# Patient Record
Sex: FEMALE | Race: WHITE
[De-identification: names, ages, dates, MRNs, and addresses within clinical notes are randomized per-mention and may not be internally consistent; named-entity substitution may affect disease eponyms.]

---

## 2015-01-02 VITALS — SYSTOLIC BLOOD PRESSURE: 132 MMHG | DIASTOLIC BLOOD PRESSURE: 80 MMHG

## 2017-10-24 ENCOUNTER — HOSPITAL ENCOUNTER (OUTPATIENT)
Dept: HOSPITAL 61 - LAB | Age: 42
Discharge: HOME | End: 2017-10-24
Payer: COMMERCIAL

## 2017-10-24 DIAGNOSIS — Z51.81: Primary | ICD-10-CM

## 2017-10-24 DIAGNOSIS — Z79.899: ICD-10-CM

## 2017-10-24 LAB
ALBUMIN SERPL-MCNC: 4.4 G/DL (ref 3.4–5)
ALBUMIN/GLOB SERPL: 1.1 {RATIO} (ref 1–1.7)
ALP SERPL-CCNC: 86 U/L (ref 46–116)
ALT SERPL-CCNC: 15 U/L (ref 14–59)
ANION GAP SERPL CALC-SCNC: 7 MMOL/L (ref 6–14)
AST SERPL-CCNC: 17 U/L (ref 15–37)
BASOPHILS # BLD AUTO: 0.1 X10^3/UL (ref 0–0.2)
BASOPHILS NFR BLD: 1 % (ref 0–3)
BILIRUB SERPL-MCNC: 0.3 MG/DL (ref 0.2–1)
BUN SERPL-MCNC: 11 MG/DL (ref 7–20)
BUN/CREAT SERPL: 18 (ref 6–20)
CALCIUM SERPL-MCNC: 9.9 MG/DL (ref 8.5–10.1)
CHLORIDE SERPL-SCNC: 99 MMOL/L (ref 98–107)
CHOLEST SERPL-MCNC: 204 MG/DL (ref 0–200)
CHOLEST/HDLC SERPL: 3.8 {RATIO}
CO2 SERPL-SCNC: 32 MMOL/L (ref 21–32)
CREAT SERPL-MCNC: 0.6 MG/DL (ref 0.6–1)
EOSINOPHIL NFR BLD: 3 % (ref 0–3)
ERYTHROCYTE [DISTWIDTH] IN BLOOD BY AUTOMATED COUNT: 14.8 % (ref 11.5–14.5)
GFR SERPLBLD BASED ON 1.73 SQ M-ARVRAT: 111.3 ML/MIN
GLOBULIN SER-MCNC: 3.9 G/DL (ref 2.2–3.8)
GLUCOSE SERPL-MCNC: 83 MG/DL (ref 70–99)
HCT VFR BLD CALC: 45.2 % (ref 36–47)
HDLC SERPL-MCNC: 53 MG/DL (ref 40–60)
HGB BLD-MCNC: 14.9 G/DL (ref 12–15.5)
LYMPHOCYTES # BLD: 2.8 X10^3/UL (ref 1–4.8)
LYMPHOCYTES NFR BLD AUTO: 30 % (ref 24–48)
MCH RBC QN AUTO: 32 PG (ref 25–35)
MCHC RBC AUTO-ENTMCNC: 33 G/DL (ref 31–37)
MCV RBC AUTO: 98 FL (ref 79–100)
MONOCYTES NFR BLD: 6 % (ref 0–9)
NEUTROPHILS NFR BLD AUTO: 60 % (ref 31–73)
NONHDLC SERPL-MCNC: 151 MG/DL (ref 0–129)
PLATELET # BLD AUTO: 284 X10^3/UL (ref 140–400)
POTASSIUM SERPL-SCNC: 4.4 MMOL/L (ref 3.5–5.1)
PROT SERPL-MCNC: 8.3 G/DL (ref 6.4–8.2)
RBC # BLD AUTO: 4.63 X10^6/UL (ref 3.5–5.4)
SODIUM SERPL-SCNC: 138 MMOL/L (ref 136–145)
TRIGL SERPL-MCNC: 70 MG/DL (ref 0–150)
WBC # BLD AUTO: 9.3 X10^3/UL (ref 4–11)

## 2017-10-24 PROCEDURE — 80053 COMPREHEN METABOLIC PANEL: CPT

## 2017-10-24 PROCEDURE — 36415 COLL VENOUS BLD VENIPUNCTURE: CPT

## 2017-10-24 PROCEDURE — 80061 LIPID PANEL: CPT

## 2017-10-24 PROCEDURE — 84443 ASSAY THYROID STIM HORMONE: CPT

## 2017-10-24 PROCEDURE — 83036 HEMOGLOBIN GLYCOSYLATED A1C: CPT

## 2017-10-24 PROCEDURE — 85025 COMPLETE CBC W/AUTO DIFF WBC: CPT

## 2018-11-28 ENCOUNTER — HOSPITAL ENCOUNTER (EMERGENCY)
Dept: HOSPITAL 61 - ER | Age: 43
Discharge: HOME | End: 2018-11-28
Payer: COMMERCIAL

## 2018-11-28 VITALS — SYSTOLIC BLOOD PRESSURE: 135 MMHG | DIASTOLIC BLOOD PRESSURE: 93 MMHG

## 2018-11-28 VITALS — WEIGHT: 102 LBS | HEIGHT: 64 IN | BODY MASS INDEX: 17.42 KG/M2

## 2018-11-28 DIAGNOSIS — N30.01: Primary | ICD-10-CM

## 2018-11-28 DIAGNOSIS — Z98.51: ICD-10-CM

## 2018-11-28 LAB
APTT PPP: YELLOW S
BACTERIA #/AREA URNS HPF: (no result) /HPF
BILIRUB UR QL STRIP: NEGATIVE
FIBRINOGEN PPP-MCNC: CLEAR MG/DL
NITRITE UR QL STRIP: POSITIVE
PH UR STRIP: 6 [PH]
PROT UR STRIP-MCNC: 100 MG/DL
RBC #/AREA URNS HPF: (no result) /HPF (ref 0–2)
SQUAMOUS #/AREA URNS LPF: (no result) /LPF
UROBILINOGEN UR-MCNC: 1 MG/DL
WBC #/AREA URNS HPF: (no result) /HPF (ref 0–4)

## 2018-11-28 PROCEDURE — 96372 THER/PROPH/DIAG INJ SC/IM: CPT

## 2018-11-28 PROCEDURE — 81001 URINALYSIS AUTO W/SCOPE: CPT

## 2018-11-28 PROCEDURE — 99283 EMERGENCY DEPT VISIT LOW MDM: CPT

## 2018-11-28 PROCEDURE — 87086 URINE CULTURE/COLONY COUNT: CPT

## 2018-11-28 NOTE — PHYS DOC
Past Medical History


Past Medical History:  No Pertinent History


Additional Past Medical Histor:  MIGRAINES


Past Surgical History:  Tubal ligation


Alcohol Use:  None


Drug Use:  None





Adult General


Chief Complaint


Chief Complaint:  URINARY FREQUENCY





HPI


HPI





Patient is a 43  year old female who presents with dysuria and left lower back 

pain. Patient reports she started having dysuria yesterday. She reports she 

began having lower back pain today. She denies any fever, nausea or vomiting.





Review of Systems


Review of Systems





Constitutional: Denies fever or chills []


Eyes: Denies change in visual acuity, redness, or eye pain []


Respiratory: Denies cough or shortness of breath []


Cardiovascular: No additional information not addressed in HPI []


GI: Denies abdominal pain, nausea, vomiting


: Reports dysuria, hematuria, frequency and urgency


Musculoskeletal: Reports left lower back pain


Integument: Denies rash or skin lesions []


Neurologic: Denies headache, focal weakness or sensory changes []








All other systems were reviewed and found to be within normal limits, except as 

documented in this note.





Current Medications


Current Medications





Current Medications








 Medications


  (Trade)  Dose


 Ordered  Sig/Prabhakar  Start Time


 Stop Time Status Last Admin


Dose Admin


 


 Ketorolac


 Tromethamine


  (Toradol Im)  60 mg  1X  ONCE  11/28/18 22:45


 11/28/18 22:46 DC 11/28/18 22:40


60 MG


 


 Phenazopyridine


 HCl


  (Pyridium)  200 mg  1X  ONCE  11/28/18 22:45


 11/28/18 22:46 DC 11/28/18 22:40


200 MG











Allergies


Allergies





Allergies








Coded Allergies Type Severity Reaction Last Updated Verified


 


  No Known Drug Allergies    1/2/15 No











Physical Exam


Physical Exam





Constitutional: Well developed, well nourished, no acute distress, non-toxic 

appearance. []


HENT: Normocephalic, atraumatic


Eyes: PERRLA, EOMI, conjunctiva normal, no discharge. [] 


Neck: Normal range of motion, no tenderness, supple, no stridor. [] 


Cardiovascular:Heart rate regular rhythm, no murmur []


Lungs & Thorax:  Bilateral breath sounds clear to auscultation []


Abdomen: Bowel sounds normal, soft, no tenderness


Skin: Warm, dry, no erythema, no rash. [] 


Neurologic: Alert and oriented X 3, normal motor function, normal sensory 

function, no focal deficits noted. []


Psychologic: Affect normal, judgement normal, mood normal. []





Current Patient Data


Vital Signs





 Vital Signs








  Date Time  Temp Pulse Resp B/P (MAP) Pulse Ox O2 Delivery O2 Flow Rate FiO2


 


11/28/18 22:15 98.3 102 18 135/93 (107) 98 Room Air  





 98.3       








Lab Values





 Laboratory Tests








Test


 11/28/18


22:08


 


Urine Collection Type Unknown  


 


Urine Color Yellow  


 


Urine Clarity Clear  


 


Urine pH 6.0  


 


Urine Specific Gravity 1.020  


 


Urine Protein


 100 mg/dL


(NEG-TRACE)


 


Urine Glucose (UA)


 Negative mg/dL


(NEG)


 


Urine Ketones (Stick)


 15 mg/dL (NEG)





 


Urine Blood Large (NEG)  


 


Urine Nitrite


 Positive (NEG)





 


Urine Bilirubin


 Negative (NEG)





 


Urine Urobilinogen Dipstick


 1.0 mg/dL (0.2


mg/dL)


 


Urine Leukocyte Esterase


 Moderate (NEG)





 


Urine RBC


 Tntc /HPF


(0-2)


 


Urine WBC


 Tntc /HPF


(0-4)


 


Urine Squamous Epithelial


Cells Occ /LPF  





 


Urine Bacteria


 Moderate /HPF


(0-FEW)


 


Urine Mucus Slight /LPF  











EKG


EKG


[]





Radiology/Procedures


Radiology/Procedures


[]





Course & Med Decision Making


Course & Med Decision Making


Pertinent Labs and Imaging studies reviewed. (See chart for details)





Plan: Bactrim Rx, Pyridium Rx, follow-up with PCP, return precautions reviewed[]





Dragon Disclaimer


Dragon Disclaimer


This electronic medical record was generated, in whole or in part, using a 

voice recognition dictation system.





Departure


Departure


Impression:  


 Primary Impression:  


 UTI (urinary tract infection)


Disposition:  01 HOME, SELF-CARE


Condition:  GOOD


Referrals:  


NO PCP (PCP)








CHASE IRVIN MD


Patient Instructions:  Urinary Tract Infection


Scripts


Phenazopyridine Hcl (PYRIDIUM) 100 Mg Tablet


100 MG PO TID, #9 TAB


   Prov: GOLDIE FERNANDES APRN         11/28/18 


Sulfamethoxazole/Trimethoprim (BACTRIM DS TABLET) 1 Each Tablet


1 TAB PO BID, #14 TAB


   Prov: GOLDIE FERNANDES APRN 11/28/18





Attending Signature


Attending Signature


I have reviewed the PA/NP's note and plan of care. I was available for 

consultation as needed during the patient's visit in the emergency department. 

I agree with the clinical impression, plan, and disposition.





Problem Qualifiers








 Primary Impression:  


 UTI (urinary tract infection)


 Urinary tract infection type:  acute cystitis  Hematuria presence:  with 

hematuria  Qualified Codes:  N30.01 - Acute cystitis with hematuria








GOLDIE FERNANDES Nov 28, 2018 22:59


SHEILA WADE DO Nov 29, 2018 01:15

## 2019-01-31 ENCOUNTER — HOSPITAL ENCOUNTER (EMERGENCY)
Dept: HOSPITAL 61 - ER | Age: 44
Discharge: HOME | End: 2019-01-31
Payer: COMMERCIAL

## 2019-01-31 VITALS — WEIGHT: 102 LBS | HEIGHT: 64 IN | BODY MASS INDEX: 17.42 KG/M2

## 2019-01-31 VITALS — SYSTOLIC BLOOD PRESSURE: 140 MMHG | DIASTOLIC BLOOD PRESSURE: 84 MMHG

## 2019-01-31 DIAGNOSIS — Z98.51: ICD-10-CM

## 2019-01-31 DIAGNOSIS — K04.7: Primary | ICD-10-CM

## 2019-01-31 DIAGNOSIS — G43.909: ICD-10-CM

## 2019-01-31 PROCEDURE — 96372 THER/PROPH/DIAG INJ SC/IM: CPT

## 2019-01-31 PROCEDURE — 99283 EMERGENCY DEPT VISIT LOW MDM: CPT

## 2019-01-31 NOTE — PHYS DOC
Past Medical History


Past Medical History:  No Pertinent History


Additional Past Medical Histor:  MIGRAINES


Past Surgical History:  Tubal ligation


Alcohol Use:  None


Drug Use:  None





Adult General


Chief Complaint


Chief Complaint:  DENTAL PROBLEM





HPI


HPI





Patient is a 43  year old [f__sex] who presents with []





Review of Systems


Review of Systems





Constitutional: Denies fever or chills []


Eyes: Denies change in visual acuity, redness, or eye pain []


HENT: Denies nasal congestion or sore throat []


Respiratory: Denies cough or shortness of breath []


Cardiovascular: No additional information not addressed in HPI []


GI: Denies abdominal pain, nausea, vomiting, bloody stools or diarrhea []


: Denies dysuria or hematuria []


Musculoskeletal: Denies back pain or joint pain []


Integument: Denies rash or skin lesions []


Neurologic: Denies headache, focal weakness or sensory changes []


Endocrine: Denies polyuria or polydipsia []





All other systems were reviewed and found to be within normal limits, except as 

documented in this note.





Current Medications


Current Medications





Current Medications








 Medications


  (Trade)  Dose


 Ordered  Sig/Prabhakar  Start Time


 Stop Time Status Last Admin


Dose Admin


 


 Ketorolac


 Tromethamine


  (Toradol Im)  60 mg  1X  ONCE  1/31/19 23:45


 1/31/19 23:46 DC 1/31/19 23:49


60 MG











Allergies


Allergies





Allergies








Coded Allergies Type Severity Reaction Last Updated Verified


 


  No Known Drug Allergies    1/2/15 No











Physical Exam


Physical Exam





Constitutional: Well developed, well nourished, no acute distress, non-toxic 

appearance. []


HENT: Normocephalic, atraumatic, bilateral external ears normal, oropharynx 

moist, no oral exudates, nose normal. []


Eyes: PERRLA, EOMI, conjunctiva normal, no discharge. [] 


Neck: Normal range of motion, no tenderness, supple, no stridor. [] 


Cardiovascular:Heart rate regular rhythm, no murmur []


Lungs & Thorax:  Bilateral breath sounds clear to auscultation []


Abdomen: Bowel sounds normal, soft, no tenderness, no masses, no pulsatile 

masses. [] 


Skin: Warm, dry, no erythema, no rash. [] 


Back: No tenderness, no CVA tenderness. [] 


Extremities: No tenderness, no cyanosis, no clubbing, ROM intact, no edema. [] 


Neurologic: Alert and oriented X 3, normal motor function, normal sensory 

function, no focal deficits noted. []


Psychologic: Affect normal, judgement normal, mood normal. []





EKG


EKG


[]





Radiology/Procedures


Radiology/Procedures


[]





Course & Med Decision Making


Course & Med Decision Making


Pertinent Labs and Imaging studies reviewed. (See chart for details)





[]





Dragon Disclaimer


Dragon Disclaimer


This electronic medical record was generated, in whole or in part, using a 

voice recognition dictation system.





Departure


Departure


Impression:  


 Primary Impression:  


 Dental infection


Disposition:  01 HOME, SELF-CARE


Condition:  STABLE


Referrals:  


NO PCP (PCP)


Patient Instructions:  Dental Caries





Additional Instructions:  


Take the medication as prescribed. Do not drive or operate heavy machinery 

while taking the pain medication. Keep your dental appointment for this coming 

Saturday. If worsening return to the emergency department.


Scripts


Hydrocodone/Apap 5-325 (NORCO 5-325 TABLET) 1 Each Tablet


1 TAB PO PRN Q6HRS PRN for PAIN, #14 TAB 0 Refills


   Prov: FIDE BASSETT         1/31/19 


Amoxicillin (AMOXICILLIN) 500 Mg Capsule


2 CAP PO BID for dental infection, #40 CAP


   Prov: FIDE BASSETT APRN         1/31/19











FIDE BASSETT Jan 31, 2019 23:53

## 2019-03-05 ENCOUNTER — HOSPITAL ENCOUNTER (EMERGENCY)
Dept: HOSPITAL 61 - ER | Age: 44
Discharge: HOME | End: 2019-03-05
Payer: COMMERCIAL

## 2019-03-05 VITALS — WEIGHT: 102 LBS | HEIGHT: 64 IN | BODY MASS INDEX: 17.42 KG/M2

## 2019-03-05 VITALS — SYSTOLIC BLOOD PRESSURE: 107 MMHG | DIASTOLIC BLOOD PRESSURE: 73 MMHG

## 2019-03-05 DIAGNOSIS — Z98.890: ICD-10-CM

## 2019-03-05 DIAGNOSIS — Z98.51: ICD-10-CM

## 2019-03-05 DIAGNOSIS — G43.909: ICD-10-CM

## 2019-03-05 DIAGNOSIS — R42: ICD-10-CM

## 2019-03-05 DIAGNOSIS — R55: Primary | ICD-10-CM

## 2019-03-05 DIAGNOSIS — F41.9: ICD-10-CM

## 2019-03-05 DIAGNOSIS — E87.6: ICD-10-CM

## 2019-03-05 DIAGNOSIS — F32.9: ICD-10-CM

## 2019-03-05 LAB
ALBUMIN SERPL-MCNC: 3.3 G/DL (ref 3.4–5)
ALBUMIN/GLOB SERPL: 1.1 {RATIO} (ref 1–1.7)
ALP SERPL-CCNC: 57 U/L (ref 46–116)
ALT SERPL-CCNC: 12 U/L (ref 14–59)
ANION GAP SERPL CALC-SCNC: 9 MMOL/L (ref 6–14)
APTT PPP: YELLOW S
AST SERPL-CCNC: 20 U/L (ref 15–37)
BACTERIA #/AREA URNS HPF: 0 /HPF
BASOPHILS # BLD AUTO: 0 X10^3/UL (ref 0–0.2)
BASOPHILS NFR BLD: 0 % (ref 0–3)
BILIRUB SERPL-MCNC: 0.4 MG/DL (ref 0.2–1)
BILIRUB UR QL STRIP: NEGATIVE
BUN SERPL-MCNC: 11 MG/DL (ref 7–20)
BUN/CREAT SERPL: 22 (ref 6–20)
CALCIUM SERPL-MCNC: 7.9 MG/DL (ref 8.5–10.1)
CHLORIDE SERPL-SCNC: 107 MMOL/L (ref 98–107)
CK SERPL-CCNC: 97 U/L (ref 26–192)
CO2 SERPL-SCNC: 27 MMOL/L (ref 21–32)
CREAT SERPL-MCNC: 0.5 MG/DL (ref 0.6–1)
EOSINOPHIL NFR BLD: 0.2 X10^3/UL (ref 0–0.7)
EOSINOPHIL NFR BLD: 3 % (ref 0–3)
ERYTHROCYTE [DISTWIDTH] IN BLOOD BY AUTOMATED COUNT: 14.5 % (ref 11.5–14.5)
FIBRINOGEN PPP-MCNC: CLEAR MG/DL
GFR SERPLBLD BASED ON 1.73 SQ M-ARVRAT: 136 ML/MIN
GLOBULIN SER-MCNC: 3.1 G/DL (ref 2.2–3.8)
GLUCOSE SERPL-MCNC: 77 MG/DL (ref 70–99)
HCT VFR BLD CALC: 40.8 % (ref 36–47)
HGB BLD-MCNC: 13.4 G/DL (ref 12–15.5)
LYMPHOCYTES # BLD: 3.4 X10^3/UL (ref 1–4.8)
LYMPHOCYTES NFR BLD AUTO: 39 % (ref 24–48)
MAGNESIUM SERPL-MCNC: 2 MG/DL (ref 1.8–2.4)
MCH RBC QN AUTO: 32 PG (ref 25–35)
MCHC RBC AUTO-ENTMCNC: 33 G/DL (ref 31–37)
MCV RBC AUTO: 99 FL (ref 79–100)
MONO #: 0.4 X10^3/UL (ref 0–1.1)
MONOCYTES NFR BLD: 4 % (ref 0–9)
NEUT #: 4.6 X10^3UL (ref 1.8–7.7)
NEUTROPHILS NFR BLD AUTO: 53 % (ref 31–73)
NITRITE UR QL STRIP: NEGATIVE
PH UR STRIP: 7 [PH]
PLATELET # BLD AUTO: 280 X10^3/UL (ref 140–400)
POTASSIUM SERPL-SCNC: 3.4 MMOL/L (ref 3.5–5.1)
PROT SERPL-MCNC: 6.4 G/DL (ref 6.4–8.2)
PROT UR STRIP-MCNC: NEGATIVE MG/DL
RBC # BLD AUTO: 4.13 X10^6/UL (ref 3.5–5.4)
RBC #/AREA URNS HPF: (no result) /HPF (ref 0–2)
SODIUM SERPL-SCNC: 143 MMOL/L (ref 136–145)
SQUAMOUS #/AREA URNS LPF: (no result) /LPF
UROBILINOGEN UR-MCNC: 0.2 MG/DL
WBC # BLD AUTO: 8.6 X10^3/UL (ref 4–11)
WBC #/AREA URNS HPF: 0 /HPF (ref 0–4)

## 2019-03-05 PROCEDURE — 96360 HYDRATION IV INFUSION INIT: CPT

## 2019-03-05 PROCEDURE — 84484 ASSAY OF TROPONIN QUANT: CPT

## 2019-03-05 PROCEDURE — 81001 URINALYSIS AUTO W/SCOPE: CPT

## 2019-03-05 PROCEDURE — 85025 COMPLETE CBC W/AUTO DIFF WBC: CPT

## 2019-03-05 PROCEDURE — 81025 URINE PREGNANCY TEST: CPT

## 2019-03-05 PROCEDURE — 83735 ASSAY OF MAGNESIUM: CPT

## 2019-03-05 PROCEDURE — 99284 EMERGENCY DEPT VISIT MOD MDM: CPT

## 2019-03-05 PROCEDURE — 36415 COLL VENOUS BLD VENIPUNCTURE: CPT

## 2019-03-05 PROCEDURE — 80053 COMPREHEN METABOLIC PANEL: CPT

## 2019-03-05 PROCEDURE — 93005 ELECTROCARDIOGRAM TRACING: CPT

## 2019-03-05 PROCEDURE — 82553 CREATINE MB FRACTION: CPT

## 2019-03-05 PROCEDURE — 70450 CT HEAD/BRAIN W/O DYE: CPT

## 2019-03-05 NOTE — PHYS DOC
Past Medical History


Past Medical History:  Anxiety, Depression, Other


Additional Past Medical Histor:  MIGRAINES, PTSD, INSOMNIA


Past Surgical History:  , Tubal ligation


Alcohol Use:  None


Drug Use:  None





Adult General


Chief Complaint


Chief Complaint:  NEAR SYNCOPE





HPI


HPI





Patient is a 43  year old who presented to the ED after a syncopal episode 

earlier today. While walking around at work and lifting boxes, she started to 

feel hot in the face, flushed, and lightheaded.


Then she remembers waking up on the floor with her coworkers around her. She 

and her coworkers are unaware of how long she was down. Patient also reports 

having acute vision changes lately. She's had unilateral visual blackouts of 

either eye as a prodrome to her chronic migraines. However lately both eyes go 

completely black instantly without warning. She is going to see the eye doctor 

within the next few days. Patient reports he drinks a large amount of caffeine 

and minimal water. She has a frequent episodes of facial flushing and feeling 

lightheaded like this but usually does not faint.  Denies pregnancy.





Review of Systems


Review of Systems





Constitutional: Denies fever or chills []


Eyes: Admits to visual field changes. Denies redness or pain


HENT: Denies nasal congestion or sore throat []


Respiratory: Denies cough or shortness of breath []


Cardiovascular: Denies chest pain or palpitations.


GI: Denies abdominal pain, nausea, vomiting, or diarrhea []


: Denies dysuria or hematuria []


Musculoskeletal: Denies back pain or joint pain []


Integument: Denies rash or skin lesions []


Neurologic: Denies headache, focal weakness or sensory changes []








Complete systems were reviewed and found to be within normal limits, except as 

documented in this note.





Current Medications


Current Medications





Current Medications








 Medications


  (Trade)  Dose


 Ordered  Sig/Prabhakar  Start Time


 Stop Time Status Last Admin


Dose Admin


 


 Potassium Chloride


  (Klor-Con)  40 meq  1X  ONCE  3/5/19 23:00


 3/5/19 23:01 DC 3/5/19 23:07


40 MEQ


 


 Sodium Chloride  1,000 ml @ 


 1,000 mls/hr  1X  ONCE  3/5/19 20:15


 3/5/19 21:14 DC 3/5/19 21:30


1,000 MLS/HR











Allergies


Allergies





Allergies








Coded Allergies Type Severity Reaction Last Updated Verified


 


  No Known Drug Allergies    1/2/15 No











Physical Exam


Physical Exam





Constitutional: Well developed, well nourished, no acute distress, non-toxic 

appearance. []


HENT: Normocephalic, atraumatic, oropharynx moist


Eyes: PERRL, EOMI, conjunctiva normal, no discharge. [] 


Neck: Normal range of motion, no tenderness, supple, no stridor. [] 


Cardiovascular: Heart rate regular rhythm, no murmur []


Lungs & Thorax:  Bilateral breath sounds clear to auscultation []


Abdomen: soft, no tenderness


Skin: Warm, dry, no erythema, no rash. [] 


Back: No tenderness, no CVA tenderness. [] 


Extremities: No tenderness, ROM intact, no edema. [] 


Neurologic: Alert and oriented X 3, normal motor function, normal sensory 

function, no focal deficits noted. []


Psychologic: Affect normal, judgement normal, mood normal. []





Current Patient Data


Vital Signs





 Vital Signs








  Date Time  Temp Pulse Resp B/P (MAP) Pulse Ox O2 Delivery O2 Flow Rate FiO2


 


3/5/19 23:00  78 18  100   


 


3/5/19 19:47 98.0   117/79 (92)  Room Air  





 98.0       








Lab Values





 Laboratory Tests








Test


 3/5/19


21:00 3/5/19


21:21


 


White Blood Count


 8.6 x10^3/uL


(4.0-11.0) 





 


Red Blood Count


 4.13 x10^6/uL


(3.50-5.40) 





 


Hemoglobin


 13.4 g/dL


(12.0-15.5) 





 


Hematocrit


 40.8 %


(36.0-47.0) 





 


Mean Corpuscular Volume


 99 fL ()


 





 


Mean Corpuscular Hemoglobin 32 pg (25-35)   


 


Mean Corpuscular Hemoglobin


Concent 33 g/dL


(31-37) 





 


Red Cell Distribution Width


 14.5 %


(11.5-14.5) 





 


Platelet Count


 280 x10^3/uL


(140-400) 





 


Neutrophils (%) (Auto) 53 % (31-73)   


 


Lymphocytes (%) (Auto) 39 % (24-48)   


 


Monocytes (%) (Auto) 4 % (0-9)   


 


Eosinophils (%) (Auto) 3 % (0-3)   


 


Basophils (%) (Auto) 0 % (0-3)   


 


Neutrophils # (Auto)


 4.6 x10^3uL


(1.8-7.7) 





 


Lymphocytes # (Auto)


 3.4 x10^3/uL


(1.0-4.8) 





 


Monocytes # (Auto)


 0.4 x10^3/uL


(0.0-1.1) 





 


Eosinophils # (Auto)


 0.2 x10^3/uL


(0.0-0.7) 





 


Basophils # (Auto)


 0.0 x10^3/uL


(0.0-0.2) 





 


Urine Collection Type Unknown   


 


Urine Color Yellow   


 


Urine Clarity Clear   


 


Urine pH 7.0   


 


Urine Specific Gravity 1.010   


 


Urine Protein


 Negative mg/dL


(NEG-TRACE) 





 


Urine Glucose (UA)


 Negative mg/dL


(NEG) 





 


Urine Ketones (Stick)


 Negative mg/dL


(NEG) 





 


Urine Blood


 Negative (NEG)


 





 


Urine Nitrite


 Negative (NEG)


 





 


Urine Bilirubin


 Negative (NEG)


 





 


Urine Urobilinogen Dipstick


 0.2 mg/dL (0.2


mg/dL) 





 


Urine Leukocyte Esterase


 Negative (NEG)


 





 


Urine RBC


 1-2 /HPF (0-2)


 





 


Urine WBC 0 /HPF (0-4)   


 


Urine Squamous Epithelial


Cells Few /LPF  


 





 


Urine Bacteria


 0 /HPF (0-FEW)


 





 


Urine Mucus Mod /LPF   


 


Sodium Level


 143 mmol/L


(136-145) 





 


Potassium Level


 3.4 mmol/L


(3.5-5.1)  L 





 


Chloride Level


 107 mmol/L


() 





 


Carbon Dioxide Level


 27 mmol/L


(21-32) 





 


Anion Gap 9 (6-14)   


 


Blood Urea Nitrogen


 11 mg/dL


(7-20) 





 


Creatinine


 0.5 mg/dL


(0.6-1.0)  L 





 


Estimated GFR


(Cockcroft-Gault) 136.0  


 





 


BUN/Creatinine Ratio 22 (6-20)  H 


 


Glucose Level


 77 mg/dL


(70-99) 





 


Calcium Level


 7.9 mg/dL


(8.5-10.1)  L 





 


Magnesium Level


 2.0 mg/dL


(1.8-2.4) 





 


Total Bilirubin


 0.4 mg/dL


(0.2-1.0) 





 


Aspartate Amino Transferase


(AST) 20 U/L (15-37)


 





 


Alanine Aminotransferase (ALT)


 12 U/L (14-59)


L 





 


Alkaline Phosphatase


 57 U/L


() 





 


Creatine Kinase


 97 U/L


() 





 


Creatine Kinase MB (Mass)


 0.8 ng/mL


(0.0-3.6) 





 


Creatine Kinase MB Relative


Index 0.8 % (0-4)  


 





 


Troponin I Quantitative


 < 0.017 ng/mL


(0.000-0.055) 





 


Total Protein


 6.4 g/dL


(6.4-8.2) 





 


Albumin


 3.3 g/dL


(3.4-5.0)  L 





 


Albumin/Globulin Ratio 1.1 (1.0-1.7)   


 


POC Urine HCG, Qualitative


 


 Hcg negative


(Negative)





 Laboratory Tests


3/5/19 21:00








 Laboratory Tests


3/5/19 21:00














EKG


EKG


@2058 NSR at 76bpm NO ST elevation,





Radiology/Procedures


Radiology/Procedures


PROCEDURE: CT HEAD WO CONTRAST





CT HEAD WO CONTRAST


 


History: headache, syncope


 


Comparison: None.


 


Technique: Noncontrast CT imaging was performed of the head.  Exposure: 


One or more of the following individualized dose reduction techniques were


utilized for this examination:  1. Automated exposure control  2. 


Adjustment of the mA and/or kV according to patient size  3. Use of 


iterative reconstruction technique.


 


Findings: No acute extra-axial or parenchymal hemorrhage is identified. 


There is no significant intra-axial mass effect, midline shift, or 


extra-axial fluid collection. The gray-white differentiation of the major 


vascular territories is preserved.  The ventricles, sulci, and cisterns 


are within normal limits in size and configuration.   The mastoid air 


cells and the visualized paranasal sinuses are aerated.  No acute 


calvarial abnormality is identified.


 


Impression:


1. No acute intracranial abnormality is identified.  


 


Electronically signed by: John Monsivais MD (3/5/2019 9:54 PM) [facility]





Course & Med Decision Making


Course & Med Decision Making


Pertinent Labs and Imaging studies reviewed. (See chart for details)





Patient is a 43-year-old female presented to the ED after syncopal episode at 

work. CT head showed no acute intracranial abnormalities. Neurological exam 

completely benign. Lab work unremarkable except for minor hypokalemia which was 

corrected. Given presentation at work with physical exertion, high caffeine 

intake with minimal water intake, and chronic nature of the etiology, this is 

thought to be vasovagal syncope. 





Patient stable for discharge with outpatient follow-up with PCP. Discussed 

findings and plan with patient and family, who acknowledge understanding and 

agreement.[]





Dragon Disclaimer


Dragon Disclaimer


This electronic medical record was generated, in whole or in part, using a 

voice recognition dictation system.





Departure


Departure


Impression:  


 Primary Impression:  


 Syncope


 Additional Impression:  


 Hypokalemia


Disposition:  01 HOME, SELF-CARE


Condition:  STABLE


Referrals:  


NO PCP (PCP)


Patient Instructions:  Hypokalemia-Brief, Potassium Content of Foods, Syncope, 

Easy-to-Read





Problem Qualifiers








 Primary Impression:  


 Syncope


 Syncope type:  unspecified  Qualified Codes:  R55 - Syncope and collapse








SHEILA WADE DO Mar 5, 2019 22:26

## 2019-03-05 NOTE — RAD
CT HEAD WO CONTRAST

 

History: headache, syncope

 

Comparison: None.

 

Technique: Noncontrast CT imaging was performed of the head.  Exposure: 

One or more of the following individualized dose reduction techniques were

utilized for this examination:  1. Automated exposure control  2. 

Adjustment of the mA and/or kV according to patient size  3. Use of 

iterative reconstruction technique.

 

Findings: No acute extra-axial or parenchymal hemorrhage is identified. 

There is no significant intra-axial mass effect, midline shift, or 

extra-axial fluid collection. The gray-white differentiation of the major 

vascular territories is preserved.  The ventricles, sulci, and cisterns 

are within normal limits in size and configuration.   The mastoid air 

cells and the visualized paranasal sinuses are aerated.  No acute 

calvarial abnormality is identified.

 

Impression:

1. No acute intracranial abnormality is identified.  

 

Electronically signed by: John Monsivais MD (3/5/2019 9:54 PM) [facility]

## 2019-08-05 ENCOUNTER — HOSPITAL ENCOUNTER (EMERGENCY)
Dept: HOSPITAL 61 - ER | Age: 44
Discharge: HOME | End: 2019-08-05
Payer: COMMERCIAL

## 2019-08-05 VITALS — HEIGHT: 64 IN | BODY MASS INDEX: 17.75 KG/M2 | WEIGHT: 104 LBS

## 2019-08-05 VITALS — SYSTOLIC BLOOD PRESSURE: 115 MMHG | DIASTOLIC BLOOD PRESSURE: 82 MMHG

## 2019-08-05 DIAGNOSIS — F32.9: ICD-10-CM

## 2019-08-05 DIAGNOSIS — G43.909: ICD-10-CM

## 2019-08-05 DIAGNOSIS — R55: Primary | ICD-10-CM

## 2019-08-05 DIAGNOSIS — F41.9: ICD-10-CM

## 2019-08-05 LAB
ALBUMIN SERPL-MCNC: 3.5 G/DL (ref 3.4–5)
ALBUMIN/GLOB SERPL: 1.1 {RATIO} (ref 1–1.7)
ALP SERPL-CCNC: 61 U/L (ref 46–116)
ALT SERPL-CCNC: 16 U/L (ref 14–59)
ANION GAP SERPL CALC-SCNC: 6 MMOL/L (ref 6–14)
APTT PPP: YELLOW S
AST SERPL-CCNC: 15 U/L (ref 15–37)
BACTERIA #/AREA URNS HPF: (no result) /HPF
BASOPHILS # BLD AUTO: 0.1 X10^3/UL (ref 0–0.2)
BASOPHILS NFR BLD: 1 % (ref 0–3)
BILIRUB SERPL-MCNC: 0.3 MG/DL (ref 0.2–1)
BILIRUB UR QL STRIP: NEGATIVE
BUN SERPL-MCNC: 11 MG/DL (ref 7–20)
BUN/CREAT SERPL: 16 (ref 6–20)
CALCIUM SERPL-MCNC: 8.7 MG/DL (ref 8.5–10.1)
CHLORIDE SERPL-SCNC: 105 MMOL/L (ref 98–107)
CO2 SERPL-SCNC: 30 MMOL/L (ref 21–32)
CREAT SERPL-MCNC: 0.7 MG/DL (ref 0.6–1)
EOSINOPHIL NFR BLD: 0.3 X10^3/UL (ref 0–0.7)
EOSINOPHIL NFR BLD: 3 % (ref 0–3)
ERYTHROCYTE [DISTWIDTH] IN BLOOD BY AUTOMATED COUNT: 14.3 % (ref 11.5–14.5)
FIBRINOGEN PPP-MCNC: CLEAR MG/DL
GFR SERPLBLD BASED ON 1.73 SQ M-ARVRAT: 92.2 ML/MIN
GLOBULIN SER-MCNC: 3.2 G/DL (ref 2.2–3.8)
GLUCOSE SERPL-MCNC: 69 MG/DL (ref 70–99)
HCT VFR BLD CALC: 36.2 % (ref 36–47)
HGB BLD-MCNC: 12.4 G/DL (ref 12–15.5)
LYMPHOCYTES # BLD: 3.4 X10^3/UL (ref 1–4.8)
LYMPHOCYTES NFR BLD AUTO: 40 % (ref 24–48)
MAGNESIUM SERPL-MCNC: 2 MG/DL (ref 1.8–2.4)
MCH RBC QN AUTO: 34 PG (ref 25–35)
MCHC RBC AUTO-ENTMCNC: 34 G/DL (ref 31–37)
MCV RBC AUTO: 99 FL (ref 79–100)
MONO #: 0.5 X10^3/UL (ref 0–1.1)
MONOCYTES NFR BLD: 6 % (ref 0–9)
NEUT #: 4.4 X10^3/UL (ref 1.8–7.7)
NEUTROPHILS NFR BLD AUTO: 51 % (ref 31–73)
NITRITE UR QL STRIP: NEGATIVE
PH UR STRIP: 6.5 [PH]
PLATELET # BLD AUTO: 243 X10^3/UL (ref 140–400)
POTASSIUM SERPL-SCNC: 3.7 MMOL/L (ref 3.5–5.1)
PROT SERPL-MCNC: 6.7 G/DL (ref 6.4–8.2)
PROT UR STRIP-MCNC: NEGATIVE MG/DL
RBC # BLD AUTO: 3.67 X10^6/UL (ref 3.5–5.4)
RBC #/AREA URNS HPF: (no result) /HPF (ref 0–2)
SODIUM SERPL-SCNC: 141 MMOL/L (ref 136–145)
SQUAMOUS #/AREA URNS LPF: (no result) /LPF
UROBILINOGEN UR-MCNC: 0.2 MG/DL
WBC # BLD AUTO: 8.6 X10^3/UL (ref 4–11)
WBC #/AREA URNS HPF: 0 /HPF (ref 0–4)

## 2019-08-05 PROCEDURE — 83735 ASSAY OF MAGNESIUM: CPT

## 2019-08-05 PROCEDURE — 93005 ELECTROCARDIOGRAM TRACING: CPT

## 2019-08-05 PROCEDURE — 81001 URINALYSIS AUTO W/SCOPE: CPT

## 2019-08-05 PROCEDURE — 80053 COMPREHEN METABOLIC PANEL: CPT

## 2019-08-05 PROCEDURE — 81025 URINE PREGNANCY TEST: CPT

## 2019-08-05 PROCEDURE — 85025 COMPLETE CBC W/AUTO DIFF WBC: CPT

## 2019-08-05 PROCEDURE — 36415 COLL VENOUS BLD VENIPUNCTURE: CPT

## 2019-08-05 NOTE — PHYS DOC
Past Medical History


Past Medical History:  Anxiety, Depression, Other


Additional Past Medical Histor:  MIGRAINES, PTSD, INSOMNIA


Past Surgical History:  , Tubal ligation


Alcohol Use:  None


Drug Use:  None





Adult General


Chief Complaint


Chief Complaint:  SYNCOPE





HPI


HPI





44 y/o female presents to ER via POV for c/o concerns for heat exposure as her 

employer found her asleep in her car after approx. 20 min. work break. She 

reports she has had multiple episodes of near syncope episodes and moments where

she feels like she has passed out. Patient states she was at [facility] 2 weeks ago with similar issues and she is scheduled with a 

cardiologist at the end of this month for further evaluation. Patient denies any

chest pain, shortness of air, palpitations, dizziness, or nausea and vomiting. 

She states she is uncertain if he has something to do with it as she works in a 

warehouse and she was in her car with warm temperatures today. Patient denies 

excessive diaphoresis. Patient denies urinary symptoms. She denies any 

confusion.





Review of Systems


Review of Systems





Constitutional: Denies fever or chills []


Eyes: Denies change in visual acuity, redness, or eye pain []


HENT: Denies nasal congestion or sore throat []


Respiratory: Denies cough or shortness of breath []


Cardiovascular: Denies CP/palpitations


GI: Denies abdominal pain, nausea, vomiting, bloody stools or diarrhea []


: Denies dysuria or hematuria []


Musculoskeletal: Denies back pain or joint pain []


Integument: Denies rash or skin lesions []


Neurologic: Denies headache, focal weakness or sensory changes. Reports near 

syncope episode and feeling fatigued. States she has had multiple episodes like 

this in the past where she feels she is going to pass out. States she was asleep

 in her car when employer found her stating she woke easily and was able to 

drive herself to the ER. Denies dizziness


Endocrine: Denies polyuria or polydipsia []





All other systems were reviewed and found to be within normal limits, except as 

documented in this note.





Allergies


Allergies





Allergies








Coded Allergies Type Severity Reaction Last Updated Verified


 


  No Known Drug Allergies    1/2/15 No











Physical Exam


Physical Exam





Constitutional: Well developed, well nourished, no acute distress, non-toxic 

appearance. Clear speech


HENT: Normocephalic, atraumatic, oropharynx moist, no oral exudates, nose 

normal. []


Eyes: PERRLA, no nystagmus, conjunctiva normal, no discharge. [] 


Neck: Normal range of motion, no tenderness, supple, no stridor. [] 


Cardiovascular:Heart rate regular rhythm, no murmur []


Lungs & Thorax:  Bilateral breath sounds clear to auscultation- resp. 

equal/nonlabored


Abdomen: Bowel sounds normal, soft, no tenderness, no masses, no pulsatile 

masses. [] 


Skin: Warm, dry, no erythema, no rash. [] 


Back: No tenderness, no CVA tenderness. [] 


Extremities: No tenderness, no cyanosis, no clubbing, ROM intact, no edema. [] 


Neurologic: Alert and oriented X 3, normal motor function, normal sensory 

function, no focal deficits noted. []


Psychologic: Affect normal, judgement normal, mood normal. []





Current Patient Data


Vital Signs





                                   Vital Signs








  Date Time  Temp Pulse Resp B/P (MAP) Pulse Ox O2 Delivery O2 Flow Rate FiO2


 


19 17:45  70  115/82 (93) 100 Room Air  


 


19 15:42 98.1  18     





 98.1       








Lab Values





                                Laboratory Tests








Test


 19


15:44 19


15:47 19


16:38


 


Urine Collection Type Unknown    


 


Urine Color Yellow    


 


Urine Clarity Clear    


 


Urine pH 6.5    


 


Urine Specific Gravity 1.010    


 


Urine Protein


 Negative mg/dL


(NEG-TRACE) 


 





 


Urine Glucose (UA)


 Negative mg/dL


(NEG) 


 





 


Urine Ketones (Stick)


 Negative mg/dL


(NEG) 


 





 


Urine Blood


 Negative (NEG)


 


 





 


Urine Nitrite


 Negative (NEG)


 


 





 


Urine Bilirubin


 Negative (NEG)


 


 





 


Urine Urobilinogen Dipstick


 0.2 mg/dL (0.2


mg/dL) 


 





 


Urine Leukocyte Esterase


 Negative (NEG)


 


 





 


Urine RBC


 1-2 /HPF (0-2)


 


 





 


Urine WBC 0 /HPF (0-4)    


 


Urine Squamous Epithelial


Cells Few /LPF  


 


 





 


Urine Bacteria


 Few /HPF


(0-FEW) 


 





 


Urine Mucus Mod /LPF    


 


POC Urine HCG, Qualitative


 


 Hcg negative


(Negative) 





 


White Blood Count


 


 


 8.6 x10^3/uL


(4.0-11.0)


 


Red Blood Count


 


 


 3.67 x10^6/uL


(3.50-5.40)


 


Hemoglobin


 


 


 12.4 g/dL


(12.0-15.5)


 


Hematocrit


 


 


 36.2 %


(36.0-47.0)


 


Mean Corpuscular Volume


 


 


 99 fL ()





 


Mean Corpuscular Hemoglobin   34 pg (25-35)  


 


Mean Corpuscular Hemoglobin


Concent 


 


 34 g/dL


(31-37)


 


Red Cell Distribution Width


 


 


 14.3 %


(11.5-14.5)


 


Platelet Count


 


 


 243 x10^3/uL


(140-400)


 


Neutrophils (%) (Auto)   51 % (31-73)  


 


Lymphocytes (%) (Auto)   40 % (24-48)  


 


Monocytes (%) (Auto)   6 % (0-9)  


 


Eosinophils (%) (Auto)   3 % (0-3)  


 


Basophils (%) (Auto)   1 % (0-3)  


 


Neutrophils # (Auto)


 


 


 4.4 x10^3/uL


(1.8-7.7)


 


Lymphocytes # (Auto)


 


 


 3.4 x10^3/uL


(1.0-4.8)


 


Monocytes # (Auto)


 


 


 0.5 x10^3/uL


(0.0-1.1)


 


Eosinophils # (Auto)


 


 


 0.3 x10^3/uL


(0.0-0.7)


 


Basophils # (Auto)


 


 


 0.1 x10^3/uL


(0.0-0.2)


 


Sodium Level


 


 


 141 mmol/L


(136-145)


 


Potassium Level


 


 


 3.7 mmol/L


(3.5-5.1)


 


Chloride Level


 


 


 105 mmol/L


()


 


Carbon Dioxide Level


 


 


 30 mmol/L


(21-32)


 


Anion Gap   6 (6-14)  


 


Blood Urea Nitrogen


 


 


 11 mg/dL


(7-20)


 


Creatinine


 


 


 0.7 mg/dL


(0.6-1.0)


 


Estimated GFR


(Cockcroft-Gault) 


 


 92.2  





 


BUN/Creatinine Ratio   16 (6-20)  


 


Glucose Level


 


 


 69 mg/dL


(70-99)  L


 


Calcium Level


 


 


 8.7 mg/dL


(8.5-10.1)


 


Magnesium Level


 


 


 2.0 mg/dL


(1.8-2.4)


 


Total Bilirubin


 


 


 0.3 mg/dL


(0.2-1.0)


 


Aspartate Amino Transferase


(AST) 


 


 15 U/L (15-37)





 


Alanine Aminotransferase (ALT)


 


 


 16 U/L (14-59)





 


Alkaline Phosphatase


 


 


 61 U/L


()


 


Total Protein


 


 


 6.7 g/dL


(6.4-8.2)


 


Albumin


 


 


 3.5 g/dL


(3.4-5.0)


 


Albumin/Globulin Ratio   1.1 (1.0-1.7)  





                                Laboratory Tests


19 16:38








                                Laboratory Tests


19 16:38














EKG


EKG


EKG obtained 19 at 1631


Interpreted by ER physician





Sinus rhythm


Rate 69


No STEMI





Radiology/Procedures


Radiology/Procedures


[]





Course & Med Decision Making


Course & Med Decision Making


Pertinent Labs reviewed. (See chart for details)





1730: Discussed test results with pt. and patient's orthostatics were 

unremarkable. Patient has had no episodes of dizziness or complaints while in 

the ER. Discussed blood sugar of 69 on labs. Other labs unremarkable with UA 

negative for UTI and UCG was negative. She was provided with food tray. Patient 

had reported upon arrival to ER she felt better than what she hadn't while at 

work. She is denied any complaints. CT with no acute ST elevation or STEMI. 

Patient is scheduled with cardiology at the end of the month for further 

evaluation she was encouraged to keep that appointment. Patient encouraged to 

increase fluid intake and eat well-balanced meals daily. Education provided on 

signs and symptoms to return to ER. Discharge instructions were discussed. 

Patient to follow-up with primary care physician if symptoms persist or with any

 concerns.





Dragon Disclaimer


Dragon Disclaimer


This electronic medical record was generated, in whole or in part, using a voice

 recognition dictation system.





Departure


Departure


Impression:  


   Primary Impression:  


   Near syncope


Disposition:  01 HOME, SELF-CARE


Condition:  STABLE


Referrals:  


NO PCP (PCP)


Patient Instructions:  Near-Syncope





Additional Instructions:  


As discussed drink plenty of fluids and eat well balanced meals. Your blood 

sugar was 69 while in the ER- you can take snacks to work and increase fluid 

intake due to warm temperature in your work place to increase energy.





Follow-up with your primary doctor for re-evaluation and further care. Keep your

 appointment with the cardiology as scheduled.











PRANAY MELÉNDEZ           Aug 5, 2019 17:25

## 2019-08-06 NOTE — EKG
[facility]

              [address] [place] [address]

Test Date:    2019               Test Time:    16:31:30

Pat Name:     ASHISH HERNÁNDEZ             Department:   

Patient ID:   [id number]           Room:          

Gender:       F                        Technician:   

:          [id number]               Requested By: PRANAY MELÉNDEZ

Order Number: [id number]           Reading MD:     

                                 Measurements

Intervals                              Axis          

Rate:         69                       P:            49

TN:           202                      QRS:          77

QRSD:         78                       T:            51

QT:           378                                    

QTc:          406                                    

                           Interpretive Statements

SINUS RHYTHM

QRS(T) CONTOUR ABNORMALITY

CONSISTENT WITH ANTEROSEPTAL INFARCT

PROBABLY OLD

ABNORMAL ECG

RI6.01

No previous ECG available for comparison